# Patient Record
Sex: FEMALE | Race: WHITE | NOT HISPANIC OR LATINO | Employment: FULL TIME | URBAN - METROPOLITAN AREA
[De-identification: names, ages, dates, MRNs, and addresses within clinical notes are randomized per-mention and may not be internally consistent; named-entity substitution may affect disease eponyms.]

---

## 2022-07-20 ENCOUNTER — HOSPITAL ENCOUNTER (EMERGENCY)
Facility: CLINIC | Age: 57
Discharge: HOME | End: 2022-07-20
Attending: EMERGENCY MEDICINE | Admitting: EMERGENCY MEDICINE
Payer: COMMERCIAL

## 2022-07-20 VITALS
DIASTOLIC BLOOD PRESSURE: 77 MMHG | SYSTOLIC BLOOD PRESSURE: 173 MMHG | OXYGEN SATURATION: 98 % | TEMPERATURE: 97.2 F | RESPIRATION RATE: 20 BRPM | HEART RATE: 82 BPM

## 2022-07-20 DIAGNOSIS — R19.7 ACUTE DIARRHEA: ICD-10-CM

## 2022-07-20 DIAGNOSIS — N30.00 ACUTE CYSTITIS WITHOUT HEMATURIA: ICD-10-CM

## 2022-07-20 LAB
ALBUMIN SERPL-MCNC: 3.3 G/DL (ref 3.4–5)
ALBUMIN UR-MCNC: 30 MG/DL
ALP SERPL-CCNC: 94 U/L (ref 40–150)
ALT SERPL W P-5'-P-CCNC: 27 U/L (ref 0–50)
ANION GAP SERPL CALCULATED.3IONS-SCNC: 7 MMOL/L (ref 3–14)
APPEARANCE UR: CLEAR
AST SERPL W P-5'-P-CCNC: 23 U/L (ref 0–45)
BACTERIA #/AREA URNS HPF: ABNORMAL /HPF
BASOPHILS # BLD AUTO: 0.1 10E3/UL (ref 0–0.2)
BASOPHILS NFR BLD AUTO: 0 %
BILIRUB SERPL-MCNC: 0.5 MG/DL (ref 0.2–1.3)
BILIRUB UR QL STRIP: NEGATIVE
BUN SERPL-MCNC: 9 MG/DL (ref 7–30)
CALCIUM SERPL-MCNC: 10.1 MG/DL (ref 8.5–10.1)
CHLORIDE BLD-SCNC: 103 MMOL/L (ref 94–109)
CO2 SERPL-SCNC: 23 MMOL/L (ref 20–32)
COLOR UR AUTO: YELLOW
CREAT SERPL-MCNC: 0.61 MG/DL (ref 0.52–1.04)
EOSINOPHIL # BLD AUTO: 0.2 10E3/UL (ref 0–0.7)
EOSINOPHIL NFR BLD AUTO: 1 %
ERYTHROCYTE [DISTWIDTH] IN BLOOD BY AUTOMATED COUNT: 12.9 % (ref 10–15)
GFR SERPL CREATININE-BSD FRML MDRD: >90 ML/MIN/1.73M2
GLUCOSE BLD-MCNC: 118 MG/DL (ref 70–99)
GLUCOSE UR STRIP-MCNC: NEGATIVE MG/DL
HCT VFR BLD AUTO: 48.1 % (ref 35–47)
HGB BLD-MCNC: 16 G/DL (ref 11.7–15.7)
HGB UR QL STRIP: ABNORMAL
HOLD SPECIMEN: NORMAL
HYALINE CASTS: 5 /LPF
IMM GRANULOCYTES # BLD: 0.1 10E3/UL
IMM GRANULOCYTES NFR BLD: 1 %
KETONES UR STRIP-MCNC: 40 MG/DL
LEUKOCYTE ESTERASE UR QL STRIP: ABNORMAL
LYMPHOCYTES # BLD AUTO: 2.1 10E3/UL (ref 0.8–5.3)
LYMPHOCYTES NFR BLD AUTO: 12 %
MCH RBC QN AUTO: 30.2 PG (ref 26.5–33)
MCHC RBC AUTO-ENTMCNC: 33.3 G/DL (ref 31.5–36.5)
MCV RBC AUTO: 91 FL (ref 78–100)
MONOCYTES # BLD AUTO: 1.2 10E3/UL (ref 0–1.3)
MONOCYTES NFR BLD AUTO: 6 %
MUCOUS THREADS #/AREA URNS LPF: PRESENT /LPF
NEUTROPHILS # BLD AUTO: 14.8 10E3/UL (ref 1.6–8.3)
NEUTROPHILS NFR BLD AUTO: 80 %
NITRATE UR QL: NEGATIVE
NRBC # BLD AUTO: 0 10E3/UL
NRBC BLD AUTO-RTO: 0 /100
PH UR STRIP: 6 [PH] (ref 5–7)
PLATELET # BLD AUTO: 256 10E3/UL (ref 150–450)
POTASSIUM BLD-SCNC: 3.8 MMOL/L (ref 3.4–5.3)
PROT SERPL-MCNC: 8.7 G/DL (ref 6.8–8.8)
RBC # BLD AUTO: 5.3 10E6/UL (ref 3.8–5.2)
RBC CAST: 4 /LPF
RBC URINE: 3 /HPF
SODIUM SERPL-SCNC: 133 MMOL/L (ref 133–144)
SP GR UR STRIP: 1.02 (ref 1–1.03)
SQUAMOUS EPITHELIAL: 3 /HPF
UROBILINOGEN UR STRIP-MCNC: NORMAL MG/DL
WBC # BLD AUTO: 18.4 10E3/UL (ref 4–11)
WBC URINE: 15 /HPF

## 2022-07-20 PROCEDURE — 80053 COMPREHEN METABOLIC PANEL: CPT | Performed by: EMERGENCY MEDICINE

## 2022-07-20 PROCEDURE — 96360 HYDRATION IV INFUSION INIT: CPT

## 2022-07-20 PROCEDURE — 81001 URINALYSIS AUTO W/SCOPE: CPT | Performed by: EMERGENCY MEDICINE

## 2022-07-20 PROCEDURE — 87088 URINE BACTERIA CULTURE: CPT | Performed by: EMERGENCY MEDICINE

## 2022-07-20 PROCEDURE — 96361 HYDRATE IV INFUSION ADD-ON: CPT

## 2022-07-20 PROCEDURE — 258N000003 HC RX IP 258 OP 636: Performed by: EMERGENCY MEDICINE

## 2022-07-20 PROCEDURE — 99284 EMERGENCY DEPT VISIT MOD MDM: CPT | Mod: 25

## 2022-07-20 PROCEDURE — 85025 COMPLETE CBC W/AUTO DIFF WBC: CPT | Performed by: EMERGENCY MEDICINE

## 2022-07-20 PROCEDURE — 36415 COLL VENOUS BLD VENIPUNCTURE: CPT | Performed by: EMERGENCY MEDICINE

## 2022-07-20 PROCEDURE — 87506 IADNA-DNA/RNA PROBE TQ 6-11: CPT | Performed by: EMERGENCY MEDICINE

## 2022-07-20 RX ORDER — SODIUM CHLORIDE 9 MG/ML
INJECTION, SOLUTION INTRAVENOUS CONTINUOUS
Status: DISCONTINUED | OUTPATIENT
Start: 2022-07-20 | End: 2022-07-20 | Stop reason: HOSPADM

## 2022-07-20 RX ORDER — CIPROFLOXACIN 500 MG/1
500 TABLET, FILM COATED ORAL 2 TIMES DAILY
Qty: 6 TABLET | Refills: 0 | Status: SHIPPED | OUTPATIENT
Start: 2022-07-20 | End: 2022-07-23

## 2022-07-20 RX ADMIN — SODIUM CHLORIDE 1000 ML: 9 INJECTION, SOLUTION INTRAVENOUS at 13:30

## 2022-07-20 ASSESSMENT — ENCOUNTER SYMPTOMS
VOMITING: 0
DYSURIA: 1
NAUSEA: 1
COUGH: 0
FEVER: 1
FREQUENCY: 1
DIARRHEA: 1
ABDOMINAL PAIN: 1
SHORTNESS OF BREATH: 0

## 2022-07-20 NOTE — ED PROVIDER NOTES
"  History   Chief Complaint:  Diarrhea    The history is provided by the patient.     Estella Rai is a 56 year old female with history of IBS and morbid obesity who presents with perfuse diarrhea ongoing since initial onset 4 days ago. The patient ate Yessenia Chin Saturday night and experienced perfuse diarrhea with accompanying nausea following this. She additionally reports non-radiating lower abdominal pain rated a constant 5/10 with waxing pain to a 9/10 preceding diarrhea. She took Pepto Bismol yesterday but notes no alleviation of her symptoms, prompting her presentation to urgent care at that time. There, laboratory workup was performed (see below) and she was discharged to home. She has been hydrating well with fluids but states that \"everything runs right through\" her. New lightly-bloody diarrhea today (17 episodes over the last 12 hours), low-grade fever, and concern for dehydration prompted her presentation to ED at this time. Notably, the patient does endorse urinary frequency, dysuria, and cloudiness beginning last week and persisting since. She has otherwise been well and without rash, cough, shortness of breath, or emesis. She does have history of IBS but describes this as well managed. Her current symptoms feel different from this. The patient denies recent hospitalizations, travel, or antibiotic use.    Laboratory 7/19/22  CBC: WBC 19.5 (H), HGB 15.7 (H),      Review of Systems   Constitutional: Positive for fever.   Respiratory: Negative for cough and shortness of breath.    Gastrointestinal: Positive for abdominal pain (lower), diarrhea (some blood) and nausea. Negative for vomiting.   Genitourinary: Positive for dysuria and frequency.   Skin: Negative for rash.   All other systems reviewed and are negative.    Allergies:  Nitrofurantoin  Cefdinir  Penicillins    Medications:  Diphenhydramine  Prednisone  Loperamide  Cholecalciferol  Cyanocobalamin    Past Medical History:     Acute " maxillary sinusitis  Tobacco use disorder  Vitamin D deficiency  IBS  Gestational diabetes  Morbid obesity    Past Surgical History:     section x2  Ganglion cyst    Family History:    Colorectal cancer  Hodgkins disease  Allergies  Lipids  Malignant hyperthermia  Hypertension    Social History:  The patient presents to the ED with a family member.  The patient arrived in a private vehicle.  PCP: Lolis Stauffer    Physical Exam     Patient Vitals for the past 24 hrs:   BP Pulse   22 1430 (!) 173/77 82   22 1415 (!) 170/97 82   22 1400 (!) 162/91 87   22 1300 (!) 167/93 98     Physical Exam  General: Patient is alert and cooperative.  HENT:  Normal nose, oropharynx. Moist oral mucosa.  Eyes: EOMI. Normal conjunctiva.  Neck:  Normal range of motion and appearance.   Cardiovascular:  Normal rate.   Pulmonary/Chest:  Effort normal.  Abdominal: Soft. No distension or tenderness.     Musculoskeletal: Normal range of motion. No edema or tenderness.   Neurological: oriented, normal strength, sensation, and coordination.   Skin: Warm and dry. No rash or bruising.   Psychiatric: Normal mood and affect. Normal behavior and judgement.    Emergency Department Course   Laboratory:  Labs Ordered and Resulted from Time of ED Arrival to Time of ED Departure   COMPREHENSIVE METABOLIC PANEL - Abnormal       Result Value    Sodium 133      Potassium 3.8      Chloride 103      Carbon Dioxide (CO2) 23      Anion Gap 7      Urea Nitrogen 9      Creatinine 0.61      Calcium 10.1      Glucose 118 (*)     Alkaline Phosphatase 94      AST 23      ALT 27      Protein Total 8.7      Albumin 3.3 (*)     Bilirubin Total 0.5      GFR Estimate >90     CBC WITH PLATELETS AND DIFFERENTIAL - Abnormal    WBC Count 18.4 (*)     RBC Count 5.30 (*)     Hemoglobin 16.0 (*)     Hematocrit 48.1 (*)     MCV 91      MCH 30.2      MCHC 33.3      RDW 12.9      Platelet Count 256      % Neutrophils 80      %  Lymphocytes 12      % Monocytes 6      % Eosinophils 1      % Basophils 0      % Immature Granulocytes 1      NRBCs per 100 WBC 0      Absolute Neutrophils 14.8 (*)     Absolute Lymphocytes 2.1      Absolute Monocytes 1.2      Absolute Eosinophils 0.2      Absolute Basophils 0.1      Absolute Immature Granulocytes 0.1      Absolute NRBCs 0.0     ROUTINE UA WITH MICROSCOPIC REFLEX TO CULTURE - Abnormal    Color Urine Yellow      Appearance Urine Clear      Glucose Urine Negative      Bilirubin Urine Negative      Ketones Urine 40  (*)     Specific Gravity Urine 1.019      Blood Urine Moderate (*)     pH Urine 6.0      Protein Albumin Urine 30  (*)     Urobilinogen Urine Normal      Nitrite Urine Negative      Leukocyte Esterase Urine Trace (*)     Bacteria Urine Many (*)     Mucus Urine Present (*)     RBC Urine 3 (*)     WBC Urine 15 (*)     Squamous Epithelials Urine 3 (*)     Hyaline Casts Urine 5 (*)     RBC Casts Urine 4 (*)    URINE CULTURE     Emergency Department Course:     Reviewed:  I reviewed nursing notes, vitals, past medical history and Care Everywhere.    Assessments:  1345 I obtained history and examined the patient as noted above.   1534 I rechecked the patient and explained findings.    Interventions:  Medications   0.9% sodium chloride BOLUS (0 mLs Intravenous Stopped 7/20/22 8340)     Disposition:  The patient was discharged to home.     Impression & Plan     Medical Decision Making:  Afebrile, well appearing 56 year old female with acute, non-bloody diarrhea.  No recent travel history or ill contacts or recent antibiotic use.  Benign exam. CMP unremarkable.  WBC 18.4.  UA obtained, due to complaints of dysuria, frequency.  That shows bacteriuria, trace pyruia.  Stool sample obtained, urine culture added.  Will treat acute cystis with cipro x 3 d, which would also cover a number of potential etiologies for diarrhea.  F/u  if not gradually resolving.     Diagnosis:    ICD-10-CM    1. Acute  diarrhea  R19.7    2. Acute cystitis without hematuria  N30.00      Discharge Medications:  Discharge Medication List as of 7/20/2022  4:55 PM      START taking these medications    Details   ciprofloxacin (CIPRO) 500 MG tablet Take 1 tablet (500 mg) by mouth 2 times daily for 3 days, Disp-6 tablet, R-0, E-Prescribe           Scribe Disclosure:  I, Leigh Cook, am serving as a scribe at 12:44 PM on 7/20/2022 to document services personally performed by Liborio Manuel MD based on my observations and the provider's statements to me.            Liborio Manuel MD  07/21/22 4902

## 2022-07-20 NOTE — ED TRIAGE NOTES
Arrives with diarrhea since Sunday, seen at clinic but symptoms not improving so told to come here. Denies pain or vomiting, alert and oriented, ABCs intact.     Triage Assessment     Row Name 07/20/22 1053       Triage Assessment (Adult)    Airway WDL WDL       Respiratory WDL    Respiratory WDL WDL       Skin Circulation/Temperature WDL    Skin Circulation/Temperature WDL WDL       Cardiac WDL    Cardiac WDL WDL       Peripheral/Neurovascular WDL    Peripheral Neurovascular WDL WDL       Cognitive/Neuro/Behavioral WDL    Cognitive/Neuro/Behavioral WDL WDL

## 2022-07-21 ENCOUNTER — TELEPHONE (OUTPATIENT)
Dept: EMERGENCY MEDICINE | Facility: CLINIC | Age: 57
End: 2022-07-21

## 2022-07-21 NOTE — TELEPHONE ENCOUNTER
"[facility] Emergency Department Lab result notification:    Reason for call  Inform and access patient    Lab Result  Final Enteric Bacteria and Virus Panel by NAS Stool is POSITIVE for Shiga toxin 1 gene  Recommendations in treatment per [facility] ED Lab Result Enteric Bacteria and Virus Panel protocol.    ED visit Date: 7/20/22  Symptoms reported at ED visit Estella Rai is a 56 year old female with history of IBS and morbid obesity who presents with perfuse diarrhea ongoing since initial onset 4 days ago. The patient ate Yessenia Chin Saturday night and experienced perfuse diarrhea with accompanying nausea following this. She additionally reports non-radiating lower abdominal pain rated a constant 5/10 with waxing pain to a 9/10 preceding diarrhea. She took Pepto Bismol yesterday but notes no alleviation of her symptoms, prompting her presentation to urgent care at that time. There, laboratory workup was performed (see below) and she was discharged to home. She has been hydrating well with fluids but states that \"everything runs right through\" her. New lightly-bloody diarrhea today (17 episodes over the last 12 hours), low-grade fever, and concern for dehydration prompted her presentation to ED at this time. Notably, the patient does endorse urinary frequency, dysuria, and cloudiness beginning last week and persisting since. She has otherwise been well and without rash, cough, shortness of breath, or emesis. She does have history of IBS but describes this as well managed. Her current symptoms feel different from this. The patient denies recent hospitalizations, travel, or antibiotic use.   Miscellaneous information Put on Ciprofloxacin (Cipro) 500 mg tablet, 1 tablet (500 mg) by mouth 2 times daily for 3 days.     Current symptoms  9:32 am Left voicemail message requesting a call back to [facility] ED Lab Result RN at [phone].  RN is available every day between 9 a.m. and 5:30 " p.m.    RN Assessment (Patient s current Symptoms), include time called.  [Insert Left message here if message left]  Says she is a little better than she was at the ED visit yesterday. Diarrhea was every 3 hours last night, but did get sleep in between. Still urinating, but a bit less than usual. Thinks it's because she can't keep up with fluid intake. Is taking Cipro for her UTI.     RN Recommendations/Instructions per [facility] ED lab result protocol  Patient notified of lab result and treatment recommendations.  RN reviewed information about Shiga toxin and HUS in detail from our protocol in [facility]. She does have a non [facility] provider she will call today for appt. Advised to finish full course of antibiotics as prescribed and drink plenty of fluids.We will call her back if any changes in treatment needed when the Urine culture is complete. And follow up with your PCP within 24-48 hours as the ED provider recommended. If worsens in any way, return to the ED for evaluation. Patient voices understanding and is in agreement with this plan.     Please Contact your PCP clinic or return to the Emergency department if your:    Symptoms return.    Symptoms do not improve after 3 days on antibiotic.    Symptoms do not resolve after completing antibiotic.    Symptoms worsen or other concerning symptom's.    eVro Ramirez RN  [facility] [employer] [place]  Emergency Dept Lab Result RN  Ph# [phone]     Vero Ramirez RN  [facility]  Emergency Dept Lab Result RN  Ph# [phone]     Copy of Lab result   Enteric Bacteria and Virus Panel by NAS Stool  Order: [id number]   Status: Final result     Visible to patient: No (inaccessible in [facility])    Specimen Information: Per Rectum; Stool         1 Result Note     Component Ref Range & Units 1 d ago     Campylobacter group Not Detected Not Detected     Salmonella species Not Detected Not Detected     Shigella species Not Detected Not  Detected     Vibrio group Not Detected Not Detected     Rotavirus Not Detected Not Detected     Shiga toxin 1 gene Not Detected Detected Abnormal      Shiga toxin 2 gene Not Detected Not Detected     Norovirus I and II Not Detected Not Detected     Yersinia enterocolitica Not Detected Not Detected    Resulting Agency  [facility]             Narrative  Performed by: [facility]  Testing performed by multiplexed, qualitative PCR using the [employer] Enteric Pathogens Nucleic Acid Test. Results should not be used as the sole basis for diagnosis, treatment or other patient management decisions. Positive results do not rule out co-infection with other organisms that are not detected by this test and may not be the sole or definitive cause of patient illness. Negative results in the setting of clinical illness compatible with gastroenteritis may be due to infection by pathogens that are not detected by this test or non-infectious causes such as ulcerative colitis, irritable bowel syndrome or Crohn's disease. Note: Shiga toxin producing E. coli (STEC) typically harbor one or both genes that encode for Shiga toxins 1 and 2.      Specimen Collected: 07/20/22  1:31 PM Last Resulted: 07/21/22 12:20 AM

## 2022-07-21 NOTE — RESULT ENCOUNTER NOTE
Final Enteric Bacteria and Virus Panel by NAS Stool is POSITIVE for Shiga toxin 1 gene  Recommendations in treatment per [facility] ED Lab Result Enteric Bacteria and Virus Panel protocol.

## 2022-07-21 NOTE — RESULT ENCOUNTER NOTE
[facility] Emergency Dept discharge antibiotic (if prescribed): Ciprofloxacin (Cipro) 500 mg tablet, 1 tablet (500 mg) by mouth 2 times daily for 3 days.   Date of Rx (if applicable):  7/20/22  No changes in treatment per [facility] ED Lab Result Urine culture protocol.

## 2022-07-23 LAB
BACTERIA UR CULT: ABNORMAL
BACTERIA UR CULT: ABNORMAL

## 2022-07-29 LAB
C COLI+JEJUNI+LARI FUSA STL QL NAA+PROBE: NOT DETECTED
EC STX1 GENE STL QL NAA+PROBE: DETECTED
EC STX2 GENE STL QL NAA+PROBE: NOT DETECTED
NOROV GI+II ORF1-ORF2 JNC STL QL NAA+PR: NOT DETECTED
RVA NSP5 STL QL NAA+PROBE: NOT DETECTED
SALMONELLA SP RPOD STL QL NAA+PROBE: NOT DETECTED
SHIGELLA SP+EIEC IPAH STL QL NAA+PROBE: NOT DETECTED
V CHOL+PARA RFBL+TRKH+TNAA STL QL NAA+PR: NOT DETECTED
Y ENTERO RECN STL QL NAA+PROBE: NOT DETECTED

## 2023-06-01 ENCOUNTER — HEALTH MAINTENANCE LETTER (OUTPATIENT)
Age: 58
End: 2023-06-01

## 2024-01-03 ENCOUNTER — APPOINTMENT (OUTPATIENT)
Dept: GENERAL RADIOLOGY | Facility: CLINIC | Age: 59
End: 2024-01-03
Attending: EMERGENCY MEDICINE
Payer: COMMERCIAL

## 2024-01-03 ENCOUNTER — HOSPITAL ENCOUNTER (EMERGENCY)
Facility: CLINIC | Age: 59
Discharge: HOME | End: 2024-01-03
Attending: EMERGENCY MEDICINE | Admitting: EMERGENCY MEDICINE
Payer: COMMERCIAL

## 2024-01-03 VITALS
HEART RATE: 77 BPM | OXYGEN SATURATION: 97 % | SYSTOLIC BLOOD PRESSURE: 171 MMHG | HEIGHT: 66 IN | BODY MASS INDEX: 43.39 KG/M2 | DIASTOLIC BLOOD PRESSURE: 105 MMHG | RESPIRATION RATE: 18 BRPM | WEIGHT: 270 LBS | TEMPERATURE: 98.1 F

## 2024-01-03 DIAGNOSIS — S52.521A: ICD-10-CM

## 2024-01-03 PROCEDURE — 25600 CLTX DST RDL FX/EPHYS SEP WO: CPT | Mod: RT

## 2024-01-03 PROCEDURE — 99284 EMERGENCY DEPT VISIT MOD MDM: CPT | Mod: 25

## 2024-01-03 PROCEDURE — 250N000013 HC RX MED GY IP 250 OP 250 PS 637: Performed by: EMERGENCY MEDICINE

## 2024-01-03 PROCEDURE — 73110 X-RAY EXAM OF WRIST: CPT | Mod: RT

## 2024-01-03 RX ORDER — ACETAMINOPHEN 500 MG
1000 TABLET ORAL ONCE
Status: COMPLETED | OUTPATIENT
Start: 2024-01-03 | End: 2024-01-03

## 2024-01-03 RX ORDER — HYDROCODONE BITARTRATE AND ACETAMINOPHEN 5; 325 MG/1; MG/1
1 TABLET ORAL EVERY 6 HOURS PRN
Qty: 8 TABLET | Refills: 0 | Status: SHIPPED | OUTPATIENT
Start: 2024-01-03

## 2024-01-03 RX ORDER — OXYCODONE HYDROCHLORIDE 5 MG/1
5 TABLET ORAL ONCE
Status: COMPLETED | OUTPATIENT
Start: 2024-01-03 | End: 2024-01-03

## 2024-01-03 RX ORDER — IBUPROFEN 600 MG/1
600 TABLET, FILM COATED ORAL ONCE
Status: COMPLETED | OUTPATIENT
Start: 2024-01-03 | End: 2024-01-03

## 2024-01-03 RX ADMIN — IBUPROFEN 600 MG: 600 TABLET, FILM COATED ORAL at 19:24

## 2024-01-03 RX ADMIN — OXYCODONE HYDROCHLORIDE 5 MG: 5 TABLET ORAL at 21:12

## 2024-01-03 RX ADMIN — ACETAMINOPHEN 1000 MG: 500 TABLET, FILM COATED ORAL at 19:24

## 2024-01-03 ASSESSMENT — ACTIVITIES OF DAILY LIVING (ADL): ADLS_ACUITY_SCORE: 33

## 2024-01-03 NOTE — Clinical Note
Estella Rai was seen and treated in our emergency department on 1/3/2024.  She may return to work on 01/05/2024.  Patient cannot use her right arm until cleared by orthopedic surgery but expect that she will not have utilization of her right arm for weeks.     If you have any questions or concerns, please don't hesitate to call.      Gama Kruger MD

## 2024-01-04 NOTE — ED TRIAGE NOTES
Pt. Presents to ED with complaints of stepping off her treadmill and tripped and braced herself with her R wrist. Since then she is having severe R wrist pain and swelling. Has not taken anything for the pain. CMS intact in RUE .Radial pulse present. HTN, OVSS on RA. Hx of carpal tunnel surgery on her R wrist.

## 2024-01-04 NOTE — ED PROVIDER NOTES
"  History     Chief Complaint:  Wrist Pain and Fall    HPI     Estella Rai is a 58 year old female who presents with right wrist pain after a fall earlier today. She states she was getting off of a treadmill when she tripped and fell with her right hand out.  She had immediate onset of pain which is localized to the distal radius, constant and aggravated by movement.  Denies any other injuries. No numbness or tingling. She endorses carpal tunnel in the right wrist repaired by [facility].  She denies any history of prior fracture or significant trauma to the wrist onset today.    Independent Historian:   None - Patient Only    Review of External Notes:   None    Medications:    Benadryl  Prednisone  Cyanocobalamin  Loperamide   Methocarbamol      Past Medical History:    Obesity   Tobacco use disorder    Past Surgical History:     section  Ganglion cyst removal      Physical Exam   Patient Vitals for the past 24 hrs:   BP Temp Temp src Pulse Resp SpO2 Height Weight   24 1914 (!) 171/105 98.1  F (36.7  C) Temporal 77 18 97 % 1.676 m (5' 6\") 122.5 kg (270 lb)        Physical Exam    EYES:    Conjunctiva normal.  NECK:    Supple, no meningismus.   CV:     Regular rate and rhythm     No murmurs, rubs or gallops.       2+ radial pulses bilateral.  PULM:    Clear to auscultation bilateral.       No respiratory distress.      No wheezing, rales or stridor  MSK:     Right upper extremity:      No pain with palpation or ROM of the elbow.      No scaphoid tenderness.      Tenderness to distal radius with overlying soft tissue swelling      No overlying laceration or tenting of the skin.  LYMPH:   No cervical lymphadenopathy.  NEURO:   Right upper extremity:      Median, radial and ulnar nerve intact to motor and sensation.  SKIN:    Warm, dry and intact.   PSYCH:    Mood is good and affect is appropriate.      Emergency Department Course   Imaging:  XR Wrist Right G/E 3 Views   Final Result   IMPRESSION:   1.  " Age-indeterminate fracture of the dorsal lip of the right distal radius (most likely old and healed). There is cortical angulation in this area.   2.  Mild thumb CMC degenerative arthrosis.   3.  No joint malalignment.         Procedures    Splint Placement     Procedure: Splint Placement     Indication: Fracture    Consent: Verbal     Location: Right Wrist    Preparation: None    Procedure detail:   Splint was applied by Myself  Splint type: Sugar-tong   Splint materilal: Fiberglass  After placement I checked and adjusted the fit as needed to ensure proper positioning/fit   Sensation and circulation are intact after splint placement     Patient Status: The patient tolerated the procedure well: Yes. There were no complications.    Emergency Department Course & Assessments:       Interventions:  Medications   oxyCODONE (ROXICODONE) tablet 5 mg (has no administration in time range)   acetaminophen (TYLENOL) tablet 1,000 mg (1,000 mg Oral $Given 1/3/24 1924)   ibuprofen (ADVIL/MOTRIN) tablet 600 mg (600 mg Oral $Given 1/3/24 1924)        Assessments:   I entered the patient's room and obtained history. I placed a splint, see procedure note above. I believe that they are safe for discharge at this time.    Independent Interpretation (X-rays, CTs, rhythm strip):  I independently reviewed x-ray of the right wrist in which there is a distal radius fracture.    Consultations/Discussion of Management or Tests:  None        Social Determinants of Health affecting care:   None    Disposition:  The patient was discharged to home.     Impression & Plan    CMS Diagnoses: None    Medical Decision Makin-year-old female presents with mechanical fall resulting in traumatic right wrist pain.  X-ray was read by radiology as a age-indeterminate although likely old fracture but my direct visualization is more concerning for acute fracture.  This is the exact location where patient is having pain and soft tissue swelling thus  will be treated as an acute distal radius fracture.  Patient placed in a sugar-tong splint.  Limited supply of analgesics.  Close follow-up with orthopedic surgery.    Diagnosis:    ICD-10-CM    1. Traumatic closed nondisplaced metaphyseal torus fracture of distal radius, right, initial encounter  S52.521A            Discharge Medications:  New Prescriptions    HYDROCODONE-ACETAMINOPHEN (NORCO) 5-325 MG TABLET    Take 1 tablet by mouth every 6 hours as needed for pain     Scribe Disclosure:  ISandraed, am serving as a scribe at 8:39 PM on 1/3/2024 to document services personally performed by Gama Kruger MD based on my observations and the provider's statements to me.   1/3/2024   Gama Kruger MD Matthews, Jeremiah R, MD  01/03/24 2982

## 2024-08-10 ENCOUNTER — HEALTH MAINTENANCE LETTER (OUTPATIENT)
Age: 59
End: 2024-08-10

## 2025-06-12 ENCOUNTER — HOSPITAL ENCOUNTER (EMERGENCY)
Facility: CLINIC | Age: 60
Discharge: HOME | End: 2025-06-12
Attending: EMERGENCY MEDICINE
Payer: COMMERCIAL

## 2025-06-12 ENCOUNTER — APPOINTMENT (OUTPATIENT)
Dept: GENERAL RADIOLOGY | Facility: CLINIC | Age: 60
End: 2025-06-12
Attending: EMERGENCY MEDICINE
Payer: COMMERCIAL

## 2025-06-12 VITALS
SYSTOLIC BLOOD PRESSURE: 151 MMHG | OXYGEN SATURATION: 96 % | BODY MASS INDEX: 46.34 KG/M2 | TEMPERATURE: 97.9 F | HEIGHT: 66 IN | RESPIRATION RATE: 16 BRPM | HEART RATE: 71 BPM | DIASTOLIC BLOOD PRESSURE: 86 MMHG | WEIGHT: 288.36 LBS

## 2025-06-12 DIAGNOSIS — M25.512 ACUTE PAIN OF LEFT SHOULDER: ICD-10-CM

## 2025-06-12 LAB
ANION GAP SERPL CALCULATED.3IONS-SCNC: 9 MMOL/L (ref 7–15)
ATRIAL RATE - MUSE: 81 BPM
BASOPHILS # BLD AUTO: 0.1 10E3/UL (ref 0–0.2)
BASOPHILS NFR BLD AUTO: 1 %
BUN SERPL-MCNC: 12 MG/DL (ref 6–20)
CALCIUM SERPL-MCNC: 9.5 MG/DL (ref 8.8–10.4)
CHLORIDE SERPL-SCNC: 103 MMOL/L (ref 98–107)
CREAT SERPL-MCNC: 0.52 MG/DL (ref 0.51–0.95)
DIASTOLIC BLOOD PRESSURE - MUSE: NORMAL MMHG
EGFRCR SERPLBLD CKD-EPI 2021: >90 ML/MIN/1.73M2
EOSINOPHIL # BLD AUTO: 0.2 10E3/UL (ref 0–0.7)
EOSINOPHIL NFR BLD AUTO: 2 %
ERYTHROCYTE [DISTWIDTH] IN BLOOD BY AUTOMATED COUNT: 13.3 % (ref 10–15)
GLUCOSE SERPL-MCNC: 120 MG/DL (ref 70–99)
HCO3 SERPL-SCNC: 24 MMOL/L (ref 22–29)
HCT VFR BLD AUTO: 43.2 % (ref 35–47)
HGB BLD-MCNC: 14.4 G/DL (ref 11.7–15.7)
HOLD SPECIMEN: NORMAL
HOLD SPECIMEN: NORMAL
IMM GRANULOCYTES # BLD: 0 10E3/UL
IMM GRANULOCYTES NFR BLD: 0 %
INTERPRETATION ECG - MUSE: NORMAL
LYMPHOCYTES # BLD AUTO: 2.8 10E3/UL (ref 0.8–5.3)
LYMPHOCYTES NFR BLD AUTO: 37 %
MCH RBC QN AUTO: 30.4 PG (ref 26.5–33)
MCHC RBC AUTO-ENTMCNC: 33.3 G/DL (ref 31.5–36.5)
MCV RBC AUTO: 91 FL (ref 78–100)
MONOCYTES # BLD AUTO: 0.5 10E3/UL (ref 0–1.3)
MONOCYTES NFR BLD AUTO: 6 %
NEUTROPHILS # BLD AUTO: 4 10E3/UL (ref 1.6–8.3)
NEUTROPHILS NFR BLD AUTO: 54 %
NRBC # BLD AUTO: 0 10E3/UL
NRBC BLD AUTO-RTO: 0 /100
P AXIS - MUSE: 71 DEGREES
PLATELET # BLD AUTO: 261 10E3/UL (ref 150–450)
POTASSIUM SERPL-SCNC: 4.1 MMOL/L (ref 3.4–5.3)
PR INTERVAL - MUSE: 116 MS
QRS DURATION - MUSE: 90 MS
QT - MUSE: 400 MS
QTC - MUSE: 464 MS
R AXIS - MUSE: -2 DEGREES
RBC # BLD AUTO: 4.74 10E6/UL (ref 3.8–5.2)
SODIUM SERPL-SCNC: 136 MMOL/L (ref 135–145)
SYSTOLIC BLOOD PRESSURE - MUSE: NORMAL MMHG
T AXIS - MUSE: 56 DEGREES
TROPONIN T SERPL HS-MCNC: <6 NG/L
VENTRICULAR RATE- MUSE: 81 BPM
WBC # BLD AUTO: 7.5 10E3/UL (ref 4–11)

## 2025-06-12 PROCEDURE — 73030 X-RAY EXAM OF SHOULDER: CPT | Mod: LT

## 2025-06-12 PROCEDURE — 80048 BASIC METABOLIC PNL TOTAL CA: CPT | Performed by: EMERGENCY MEDICINE

## 2025-06-12 PROCEDURE — 85004 AUTOMATED DIFF WBC COUNT: CPT | Performed by: EMERGENCY MEDICINE

## 2025-06-12 PROCEDURE — 250N000013 HC RX MED GY IP 250 OP 250 PS 637: Performed by: EMERGENCY MEDICINE

## 2025-06-12 PROCEDURE — 84484 ASSAY OF TROPONIN QUANT: CPT | Performed by: EMERGENCY MEDICINE

## 2025-06-12 PROCEDURE — 93005 ELECTROCARDIOGRAM TRACING: CPT

## 2025-06-12 PROCEDURE — 36415 COLL VENOUS BLD VENIPUNCTURE: CPT | Performed by: EMERGENCY MEDICINE

## 2025-06-12 PROCEDURE — 99285 EMERGENCY DEPT VISIT HI MDM: CPT

## 2025-06-12 RX ORDER — IBUPROFEN 200 MG
400 TABLET ORAL ONCE
Status: COMPLETED | OUTPATIENT
Start: 2025-06-12 | End: 2025-06-12

## 2025-06-12 RX ADMIN — IBUPROFEN 400 MG: 200 TABLET, FILM COATED ORAL at 15:37

## 2025-06-12 ASSESSMENT — COLUMBIA-SUICIDE SEVERITY RATING SCALE - C-SSRS
2. HAVE YOU ACTUALLY HAD ANY THOUGHTS OF KILLING YOURSELF IN THE PAST MONTH?: NO
6. HAVE YOU EVER DONE ANYTHING, STARTED TO DO ANYTHING, OR PREPARED TO DO ANYTHING TO END YOUR LIFE?: NO
1. IN THE PAST MONTH, HAVE YOU WISHED YOU WERE DEAD OR WISHED YOU COULD GO TO SLEEP AND NOT WAKE UP?: NO

## 2025-06-12 ASSESSMENT — ACTIVITIES OF DAILY LIVING (ADL)
ADLS_ACUITY_SCORE: 41
ADLS_ACUITY_SCORE: 41

## 2025-06-12 NOTE — ED PROVIDER NOTES
"  Emergency Department Note      History of Present Illness     Chief Complaint   Shoulder Pain      HPI   Estella Rai is a 59 year old female with a history of hypertension who presents with shoulder pain. Patient reports experiencing left shoulder blade pain for the last four days with radiation down her left arm starting 2 days ago. Today she reports parasethesias down her left arm to her left finger tips. She is unable to identify triggers that intensify the pain. She states she has never experienced something similar before. The patient hasn't tried any medications at home to address the pain. The patient denies neck pain, new weakness, fever, chills, recent trauma or accidents, cough, shortness of breath, blood clots, chest pain, leg swelling, and calf pain. No history of blood clots. Patient uses medical marijuana for IBS.       Independent Historian   None    Review of External Notes   No recent relevant notes.    Past Medical History     Medical History and Problem List   Hypertension  Vitamin D deficiency  Irritable Bowel Syndrome  Class 2 obesity  Tobacco use disorder  Menometrorrhagia    Medications   Cozaar    Surgical History    x2    Physical Exam     Patient Vitals for the past 24 hrs:   BP Temp Temp src Pulse Resp SpO2 Height Weight   25 1625 (!) 151/86 -- -- -- -- 96 % -- --   25 1539 135/73 -- -- 71 16 98 % -- --   25 1337 (!) 162/83 97.9  F (36.6  C) Temporal 86 18 98 % 1.676 m (5' 6\") 130.8 kg (288 lb 5.8 oz)     Physical Exam  General: alert, seated comfortably in a chair in triage.  HENT: mucous membranes moist  CV: regular rate, regular rhythm  Resp: normal effort, clear throughout, no crackles or wheezing  GI: abdomen soft and nontender, no guarding  MSK: Left posterior scapula, shoulder joint, and anterior chest wall are tender.  Passive range of motion in the shoulder is full, minimally painful.  No tenderness to the left elbow or wrist.  Skin: " appropriately warm and dry  Extremities: no edema, calves non-tender  Neuro: Strength - BUE 5/5 triceps, biceps, wrist flexion and extension, finger abduction.  Sensation - Fine touch and pain sensation intact in all dermatomes.   Psych: normal mood and affect      Diagnostics     Lab Results   Labs Ordered and Resulted from Time of ED Arrival to Time of ED Departure   BASIC METABOLIC PANEL - Abnormal       Result Value    Sodium 136      Potassium 4.1      Chloride 103      Carbon Dioxide (CO2) 24      Anion Gap 9      Urea Nitrogen 12.0      Creatinine 0.52      GFR Estimate >90      Calcium 9.5      Glucose 120 (*)    TROPONIN T, HIGH SENSITIVITY - Normal    Troponin T, High Sensitivity <6     CBC WITH PLATELETS AND DIFFERENTIAL    WBC Count 7.5      RBC Count 4.74      Hemoglobin 14.4      Hematocrit 43.2      MCV 91      MCH 30.4      MCHC 33.3      RDW 13.3      Platelet Count 261      % Neutrophils 54      % Lymphocytes 37      % Monocytes 6      % Eosinophils 2      % Basophils 1      % Immature Granulocytes 0      NRBCs per 100 WBC 0      Absolute Neutrophils 4.0      Absolute Lymphocytes 2.8      Absolute Monocytes 0.5      Absolute Eosinophils 0.2      Absolute Basophils 0.1      Absolute Immature Granulocytes 0.0      Absolute NRBCs 0.0         Imaging   XR Shoulder Left G/E 3 Views   Final Result   IMPRESSION:        Negative for acute left shoulder fracture or dislocation. Acromioclavicular degenerative changes. The glenohumeral joint space is not well profiled on these views.          EKG   ECG results from 06/12/25   EKG 12-lead, tracing only     Value    Systolic Blood Pressure     Diastolic Blood Pressure     Ventricular Rate 81    Atrial Rate 81    OR Interval 116    QRS Duration 90        QTc 464    P Axis 71    R AXIS -2    T Axis 56    Interpretation ECG      Sinus rhythm  Minimal voltage criteria for LVH, may be normal variant ( R in aVL )  Nonspecific ST abnormality  Abnormal ECG  No  previous ECGs available  Read by me            Independent Interpretation   X-ray L shoulder shows dislocation or fracture.    ED Course      Medications Administered   Medications   ibuprofen (ADVIL/MOTRIN) tablet 400 mg (400 mg Oral $Given 6/12/25 1537)       Procedures   Procedures     Discussion of Management   None    ED Course   ED Course as of 06/12/25 1640   Thu Jun 12, 2025   1525 I initially assessed the patient and obtained the above history and physical exam.      1619 I rechecked the patient.       Additional Documentation  None    Medical Decision Making / Diagnosis     CMS Diagnoses: None    MIPS   None               [facility]   Estella Rai is a 59 year old female with a history of hypertension, presenting with left shoulder pain radiating into the left arm.  On exam, the patient is neurologically intact.  Pain is reproducible on exam.  Radiographs of the shoulder are negative for fracture, significant arthritis, or other acute painful condition.  Shoulder exam is not concerning for septic arthritis, gout, cellulitis.  No concerns for DVT in the upper extremity.  I considered a pulmonary source of symptoms, but given reproducible pain and the shooting pain into the arm, I did not think it was likely to represent aortic dissection or pulmonary embolism or pneumonia.  EKG does not show any acute ischemic changes, and with reproducible pain and the pattern of symptoms, I highly doubt acute coronary syndrome.  Overall, symptoms are consistent with a radiculopathy.  I have recommended outpatient follow-up with PCP.  No indication for acute MRI given that she has no weakness.  Return to the ED right away for rapidly progressing symptoms, especially including weakness, increasing numbness, severe increase in pain, or for any other concerns.  Otherwise, I recommend taking Tylenol and/or ibuprofen as needed for pain.    Disposition   The patient was discharged.     Diagnosis     ICD-10-CM    1. Acute pain of  left shoulder  M25.512 ED To Primary Care Referral    suspected cervical radiculopathy           Discharge Medications   Discharge Medication List as of 6/12/2025  4:20 PM            Scribe Disclosure:  I, Taina Kemp, am serving as a scribe at 4:32 PM on 6/12/2025 to document services personally performed by Debbi Roberts MD based on my observations and the provider's statements to me.     Scribe Disclosure:  I, Alma Cardenas, am serving as a scribe  at 4:32 PM on 6/12/2025 to document services personally performed by Debbi Roberts MD based on my observations and the provider's statements to me.        Debbi Roberts MD  06/12/25 [id number]

## 2025-06-12 NOTE — DISCHARGE INSTRUCTIONS
In the emergency department, we did not see any signs of heart attack on your EKG.  Your shoulder x-rays do not show any severe arthritis or other bony abnormalities.  As we discussed, I suspect your symptoms are related to a compression one of the nerves coming out of your spinal cord, and passing through your neck.  This could be from a slipped disc, or overgrowth of arthritic bones.  I recommend following up with your primary care doctor to talk about imaging including an MRI of your neck.  You should return to the ED right away if you develop severe worsening of your pain, new weakness in your hand or arm, increasing numbness, new symptoms such as fever, or for any other concerns.  The meantime, I recommend doing gentle range of motion exercises in the shoulder, and taking Tylenol and/or ibuprofen as needed for pain.

## 2025-06-12 NOTE — ED TRIAGE NOTES
Pt arrives ambulatory into triage for 4 days of R shoulder pain. Today had shooting pain in her L shoulder prompting her to be seen in the ED. No hx of similar. Does have a hx of htn and is on medications.

## 2025-07-26 ENCOUNTER — HEALTH MAINTENANCE LETTER (OUTPATIENT)
Age: 60
End: 2025-07-26

## 2025-08-16 ENCOUNTER — HEALTH MAINTENANCE LETTER (OUTPATIENT)
Age: 60
End: 2025-08-16